# Patient Record
Sex: FEMALE | Race: OTHER | HISPANIC OR LATINO
[De-identification: names, ages, dates, MRNs, and addresses within clinical notes are randomized per-mention and may not be internally consistent; named-entity substitution may affect disease eponyms.]

---

## 2019-12-16 ENCOUNTER — ASOB RESULT (OUTPATIENT)
Age: 24
End: 2019-12-16

## 2019-12-16 ENCOUNTER — APPOINTMENT (OUTPATIENT)
Dept: ANTEPARTUM | Facility: CLINIC | Age: 24
End: 2019-12-16
Payer: MEDICAID

## 2019-12-16 PROCEDURE — 76801 OB US < 14 WKS SINGLE FETUS: CPT

## 2019-12-17 PROBLEM — Z00.00 ENCOUNTER FOR PREVENTIVE HEALTH EXAMINATION: Status: ACTIVE | Noted: 2019-12-17

## 2020-01-16 ENCOUNTER — APPOINTMENT (OUTPATIENT)
Dept: ANTEPARTUM | Facility: CLINIC | Age: 25
End: 2020-01-16
Payer: MEDICAID

## 2020-01-16 ENCOUNTER — ASOB RESULT (OUTPATIENT)
Age: 25
End: 2020-01-16

## 2020-01-16 PROCEDURE — 76815 OB US LIMITED FETUS(S): CPT

## 2020-02-13 ENCOUNTER — APPOINTMENT (OUTPATIENT)
Dept: ANTEPARTUM | Facility: CLINIC | Age: 25
End: 2020-02-13
Payer: MEDICAID

## 2020-02-13 ENCOUNTER — ASOB RESULT (OUTPATIENT)
Age: 25
End: 2020-02-13

## 2020-02-13 PROCEDURE — 76817 TRANSVAGINAL US OBSTETRIC: CPT

## 2020-02-13 PROCEDURE — 76805 OB US >/= 14 WKS SNGL FETUS: CPT

## 2020-05-06 ENCOUNTER — OUTPATIENT (OUTPATIENT)
Dept: OUTPATIENT SERVICES | Facility: HOSPITAL | Age: 25
LOS: 1 days | End: 2020-05-06
Payer: COMMERCIAL

## 2020-05-06 VITALS — DIASTOLIC BLOOD PRESSURE: 69 MMHG | SYSTOLIC BLOOD PRESSURE: 109 MMHG | HEART RATE: 75 BPM

## 2020-05-06 VITALS — SYSTOLIC BLOOD PRESSURE: 117 MMHG | DIASTOLIC BLOOD PRESSURE: 75 MMHG | HEART RATE: 83 BPM

## 2020-05-06 DIAGNOSIS — O47.03 FALSE LABOR BEFORE 37 COMPLETED WEEKS OF GESTATION, THIRD TRIMESTER: ICD-10-CM

## 2020-05-06 PROCEDURE — G0463: CPT

## 2020-05-06 PROCEDURE — 59025 FETAL NON-STRESS TEST: CPT

## 2020-05-06 RX ORDER — ACETAMINOPHEN 500 MG
975 TABLET ORAL ONCE
Refills: 0 | Status: COMPLETED | OUTPATIENT
Start: 2020-05-06 | End: 2020-05-06

## 2020-05-06 RX ADMIN — Medication 975 MILLIGRAM(S): at 20:00

## 2020-05-06 NOTE — OB PROVIDER TRIAGE NOTE - NSHPPHYSICALEXAM_GEN_ALL_CORE
ICU Vital Signs Last 24 Hrs  T(C): 36.6 (06 May 2020 19:28), Max: 36.6 (06 May 2020 19:28)  T(F): 97.9 (06 May 2020 19:28), Max: 97.9 (06 May 2020 19:28)  HR: 83 (06 May 2020 19:28) (83 - 83)  BP: 117/75 (06 May 2020 19:28) (117/75 - 117/75)  RR: 16 (06 May 2020 19:28) (16 - 16)        General: AOx3, NAD  Heart: RRR  Lungs: CTAB  Abd: Soft, TTP along lower abdomen, gravid. Negative CVA tenderness bilaterally  SVE: 0/0/-3            FHT: 145 bpm, periods of minimal variability + accels no decels present  [place]: No ctx present

## 2020-05-06 NOTE — OB PROVIDER TRIAGE NOTE - HISTORY OF PRESENT ILLNESS
DAVIN FRAGOSO is a 25y  at 31w6d by LMP who presents to L&D for complaints of lower abdominal cramping pain over the past day.    Patient states that around 1pm, she began experiencing 5/10 lower abdominal pain. She states that she went to the [facility] office for evaluation, at which time they advised her to come to L&D for further eval. She denies taking any OTC pain meds for pain relief, but states that the pain is somewhat alleviated when laying on her side.  Otherwise, denies any LOF, vaginal bleeding, dysuria or contraction like pain. Reports good fetal movement.      Prenatal course otherwise uncomplicated    OBhx: Denies  PMH: Midgut hernia  PSH: Denies  Med: PNV  Allergies: NKDA

## 2020-05-06 NOTE — OB PROVIDER TRIAGE NOTE - NSOBPROVIDERNOTE_OBGYN_ALL_OB_FT
DAVIN FRAGOSO is a 25y  at 31w6d by LMP who presents to L&D for complaints of lower abdominal cramping pain over the past day; r/o PTL vs. round ligament pain.      PLAN:  - FHT presents with periods of minimal variability. Will provide patient with juice and ice to aid with variability  - Unofficial bedside BPP: . Good fetal movements noted  - Will provide patient with tylenol 975mg to aid with lower abdominal pain.  - Will continue to monitor

## 2020-05-21 ENCOUNTER — ASOB RESULT (OUTPATIENT)
Age: 25
End: 2020-05-21

## 2020-05-21 ENCOUNTER — APPOINTMENT (OUTPATIENT)
Dept: ANTEPARTUM | Facility: CLINIC | Age: 25
End: 2020-05-21
Payer: MEDICAID

## 2020-05-21 VITALS — TEMPERATURE: 98.9 F

## 2020-05-21 PROCEDURE — 76819 FETAL BIOPHYS PROFIL W/O NST: CPT

## 2020-05-21 PROCEDURE — 76816 OB US FOLLOW-UP PER FETUS: CPT

## 2020-06-25 PROBLEM — Z78.9 OTHER SPECIFIED HEALTH STATUS: Chronic | Status: ACTIVE | Noted: 2020-05-06

## 2020-06-29 ENCOUNTER — APPOINTMENT (OUTPATIENT)
Dept: ANTEPARTUM | Facility: CLINIC | Age: 25
End: 2020-06-29
Payer: MEDICAID

## 2020-06-29 ENCOUNTER — ASOB RESULT (OUTPATIENT)
Age: 25
End: 2020-06-29

## 2020-06-29 PROCEDURE — 76816 OB US FOLLOW-UP PER FETUS: CPT

## 2020-06-29 PROCEDURE — 76819 FETAL BIOPHYS PROFIL W/O NST: CPT

## 2020-07-07 ENCOUNTER — OUTPATIENT (OUTPATIENT)
Dept: INPATIENT UNIT | Facility: HOSPITAL | Age: 25
LOS: 1 days | End: 2020-07-07
Payer: COMMERCIAL

## 2020-07-07 ENCOUNTER — INPATIENT (INPATIENT)
Facility: HOSPITAL | Age: 25
LOS: 0 days | Discharge: HOME | End: 2020-07-08
Attending: OBSTETRICS & GYNECOLOGY | Admitting: OBSTETRICS & GYNECOLOGY
Payer: COMMERCIAL

## 2020-07-07 ENCOUNTER — TRANSCRIPTION ENCOUNTER (OUTPATIENT)
Age: 25
End: 2020-07-07

## 2020-07-07 VITALS — TEMPERATURE: 98 F | RESPIRATION RATE: 18 BRPM

## 2020-07-07 VITALS — RESPIRATION RATE: 17 BRPM | TEMPERATURE: 98 F

## 2020-07-07 VITALS — HEART RATE: 78 BPM | DIASTOLIC BLOOD PRESSURE: 73 MMHG | SYSTOLIC BLOOD PRESSURE: 122 MMHG

## 2020-07-07 DIAGNOSIS — O47.1 FALSE LABOR AT OR AFTER 37 COMPLETED WEEKS OF GESTATION: ICD-10-CM

## 2020-07-07 DIAGNOSIS — Z3A.39 39 WEEKS GESTATION OF PREGNANCY: ICD-10-CM

## 2020-07-07 LAB
BASOPHILS # BLD AUTO: 0.02 K/UL — SIGNIFICANT CHANGE UP (ref 0–0.2)
BASOPHILS NFR BLD AUTO: 0.2 % — SIGNIFICANT CHANGE UP (ref 0–2)
BLD GP AB SCN SERPL QL: SIGNIFICANT CHANGE UP
EOSINOPHIL # BLD AUTO: 0.03 K/UL — SIGNIFICANT CHANGE UP (ref 0–0.5)
EOSINOPHIL NFR BLD AUTO: 0.3 % — SIGNIFICANT CHANGE UP (ref 0–6)
HCT VFR BLD CALC: 40.5 % — SIGNIFICANT CHANGE UP (ref 34.5–45)
HGB BLD-MCNC: 13.3 G/DL — SIGNIFICANT CHANGE UP (ref 11.5–15.5)
IMM GRANULOCYTES NFR BLD AUTO: 0.8 % — SIGNIFICANT CHANGE UP (ref 0–1.5)
LYMPHOCYTES # BLD AUTO: 1.59 K/UL — SIGNIFICANT CHANGE UP (ref 1–3.3)
LYMPHOCYTES # BLD AUTO: 15.1 % — SIGNIFICANT CHANGE UP (ref 13–44)
MCHC RBC-ENTMCNC: 27.8 PG — SIGNIFICANT CHANGE UP (ref 27–34)
MCHC RBC-ENTMCNC: 32.8 GM/DL — SIGNIFICANT CHANGE UP (ref 32–36)
MCV RBC AUTO: 84.6 FL — SIGNIFICANT CHANGE UP (ref 80–100)
MONOCYTES # BLD AUTO: 0.88 K/UL — SIGNIFICANT CHANGE UP (ref 0–0.9)
MONOCYTES NFR BLD AUTO: 8.3 % — SIGNIFICANT CHANGE UP (ref 2–14)
NEUTROPHILS # BLD AUTO: 7.94 K/UL — HIGH (ref 1.8–7.4)
NEUTROPHILS NFR BLD AUTO: 75.3 % — SIGNIFICANT CHANGE UP (ref 43–77)
PLATELET # BLD AUTO: 190 K/UL — SIGNIFICANT CHANGE UP (ref 150–400)
RBC # BLD: 4.79 M/UL — SIGNIFICANT CHANGE UP (ref 3.8–5.2)
RBC # FLD: 16.4 % — HIGH (ref 10.3–14.5)
SARS-COV-2 RNA SPEC QL NAA+PROBE: SIGNIFICANT CHANGE UP
WBC # BLD: 10.54 K/UL — HIGH (ref 3.8–10.5)
WBC # FLD AUTO: 10.54 K/UL — HIGH (ref 3.8–10.5)

## 2020-07-07 PROCEDURE — 59025 FETAL NON-STRESS TEST: CPT

## 2020-07-07 PROCEDURE — G0463: CPT

## 2020-07-07 RX ORDER — TETANUS TOXOID, REDUCED DIPHTHERIA TOXOID AND ACELLULAR PERTUSSIS VACCINE, ADSORBED 5; 2.5; 8; 8; 2.5 [IU]/.5ML; [IU]/.5ML; UG/.5ML; UG/.5ML; UG/.5ML
0.5 SUSPENSION INTRAMUSCULAR ONCE
Refills: 0 | Status: DISCONTINUED | OUTPATIENT
Start: 2020-07-07 | End: 2020-07-08

## 2020-07-07 RX ORDER — HYDROCORTISONE 1 %
1 OINTMENT (GRAM) TOPICAL EVERY 6 HOURS
Refills: 0 | Status: DISCONTINUED | OUTPATIENT
Start: 2020-07-07 | End: 2020-07-08

## 2020-07-07 RX ORDER — OXYTOCIN 10 UNIT/ML
333.33 VIAL (ML) INJECTION
Qty: 20 | Refills: 0 | Status: DISCONTINUED | OUTPATIENT
Start: 2020-07-07 | End: 2020-07-08

## 2020-07-07 RX ORDER — SODIUM CHLORIDE 9 MG/ML
3 INJECTION INTRAMUSCULAR; INTRAVENOUS; SUBCUTANEOUS EVERY 8 HOURS
Refills: 0 | Status: DISCONTINUED | OUTPATIENT
Start: 2020-07-07 | End: 2020-07-08

## 2020-07-07 RX ORDER — DIPHENHYDRAMINE HCL 50 MG
25 CAPSULE ORAL EVERY 6 HOURS
Refills: 0 | Status: DISCONTINUED | OUTPATIENT
Start: 2020-07-07 | End: 2020-07-08

## 2020-07-07 RX ORDER — KETOROLAC TROMETHAMINE 30 MG/ML
30 SYRINGE (ML) INJECTION ONCE
Refills: 0 | Status: DISCONTINUED | OUTPATIENT
Start: 2020-07-07 | End: 2020-07-08

## 2020-07-07 RX ORDER — SIMETHICONE 80 MG/1
80 TABLET, CHEWABLE ORAL EVERY 4 HOURS
Refills: 0 | Status: DISCONTINUED | OUTPATIENT
Start: 2020-07-07 | End: 2020-07-08

## 2020-07-07 RX ORDER — LANOLIN
1 OINTMENT (GRAM) TOPICAL EVERY 6 HOURS
Refills: 0 | Status: DISCONTINUED | OUTPATIENT
Start: 2020-07-07 | End: 2020-07-08

## 2020-07-07 RX ORDER — PRAMOXINE HYDROCHLORIDE 150 MG/15G
1 AEROSOL, FOAM RECTAL EVERY 4 HOURS
Refills: 0 | Status: DISCONTINUED | OUTPATIENT
Start: 2020-07-07 | End: 2020-07-08

## 2020-07-07 RX ORDER — DIBUCAINE 1 %
1 OINTMENT (GRAM) RECTAL EVERY 6 HOURS
Refills: 0 | Status: DISCONTINUED | OUTPATIENT
Start: 2020-07-07 | End: 2020-07-08

## 2020-07-07 RX ORDER — AMPICILLIN TRIHYDRATE 250 MG
1 CAPSULE ORAL EVERY 4 HOURS
Refills: 0 | Status: DISCONTINUED | OUTPATIENT
Start: 2020-07-07 | End: 2020-07-07

## 2020-07-07 RX ORDER — CITRIC ACID/SODIUM CITRATE 300-500 MG
30 SOLUTION, ORAL ORAL ONCE
Refills: 0 | Status: COMPLETED | OUTPATIENT
Start: 2020-07-07 | End: 2020-07-07

## 2020-07-07 RX ORDER — AER TRAVELER 0.5 G/1
1 SOLUTION RECTAL; TOPICAL EVERY 4 HOURS
Refills: 0 | Status: DISCONTINUED | OUTPATIENT
Start: 2020-07-07 | End: 2020-07-08

## 2020-07-07 RX ORDER — IBUPROFEN 200 MG
600 TABLET ORAL EVERY 6 HOURS
Refills: 0 | Status: COMPLETED | OUTPATIENT
Start: 2020-07-07 | End: 2021-06-05

## 2020-07-07 RX ORDER — SODIUM CHLORIDE 9 MG/ML
1000 INJECTION, SOLUTION INTRAVENOUS
Refills: 0 | Status: DISCONTINUED | OUTPATIENT
Start: 2020-07-07 | End: 2020-07-07

## 2020-07-07 RX ORDER — OXYCODONE HYDROCHLORIDE 5 MG/1
5 TABLET ORAL
Refills: 0 | Status: DISCONTINUED | OUTPATIENT
Start: 2020-07-07 | End: 2020-07-08

## 2020-07-07 RX ORDER — OXYCODONE HYDROCHLORIDE 5 MG/1
5 TABLET ORAL ONCE
Refills: 0 | Status: DISCONTINUED | OUTPATIENT
Start: 2020-07-07 | End: 2020-07-08

## 2020-07-07 RX ORDER — SODIUM CHLORIDE 9 MG/ML
1000 INJECTION, SOLUTION INTRAVENOUS ONCE
Refills: 0 | Status: COMPLETED | OUTPATIENT
Start: 2020-07-07 | End: 2020-07-07

## 2020-07-07 RX ORDER — MAGNESIUM HYDROXIDE 400 MG/1
30 TABLET, CHEWABLE ORAL
Refills: 0 | Status: DISCONTINUED | OUTPATIENT
Start: 2020-07-07 | End: 2020-07-08

## 2020-07-07 RX ORDER — ACETAMINOPHEN 500 MG
975 TABLET ORAL
Refills: 0 | Status: DISCONTINUED | OUTPATIENT
Start: 2020-07-07 | End: 2020-07-08

## 2020-07-07 RX ORDER — BENZOCAINE 10 %
1 GEL (GRAM) MUCOUS MEMBRANE EVERY 6 HOURS
Refills: 0 | Status: DISCONTINUED | OUTPATIENT
Start: 2020-07-07 | End: 2020-07-08

## 2020-07-07 RX ORDER — AMPICILLIN TRIHYDRATE 250 MG
2 CAPSULE ORAL ONCE
Refills: 0 | Status: COMPLETED | OUTPATIENT
Start: 2020-07-07 | End: 2020-07-07

## 2020-07-07 RX ADMIN — Medication 216 GRAM(S): at 17:12

## 2020-07-07 RX ADMIN — SODIUM CHLORIDE 125 MILLILITER(S): 9 INJECTION, SOLUTION INTRAVENOUS at 15:35

## 2020-07-07 RX ADMIN — Medication 1000 MILLIUNIT(S)/MIN: at 19:29

## 2020-07-07 RX ADMIN — SODIUM CHLORIDE 125 MILLILITER(S): 9 INJECTION, SOLUTION INTRAVENOUS at 18:00

## 2020-07-07 RX ADMIN — Medication 30 MILLILITER(S): at 17:45

## 2020-07-07 RX ADMIN — SODIUM CHLORIDE 4000 MILLILITER(S): 9 INJECTION, SOLUTION INTRAVENOUS at 17:30

## 2020-07-07 NOTE — OB PROVIDER DELIVERY SUMMARY - NSPROVIDERDELIVERYNOTE_OBGYN_ALL_OB_FT
with good maternal pushing , head of baby delivered. shoulder and body delivered atraumatically    nuchal cor x 2, true knot. Cord clamped and cut. placenta delivered intact.  uterus firm , laceration repaired. mild bleeding at the end of repair. cervix intact. uterus firm . continue IV Pitocin.

## 2020-07-07 NOTE — OB PROVIDER H&P - HISTORY OF PRESENT ILLNESS
25 y.o.  @ 39weeks 2 days presents to labor and delivery with contractions and bloody show.   She is having painful contracts, reports fetal movement and vaginal bleeding. She does not report loss of fluid. She started having contractions at midnight last night. She doesn't want pain medication at this time.

## 2020-07-07 NOTE — OB PROVIDER H&P - NSHPPHYSICALEXAM_GEN_ALL_CORE
Gen: NAD  Pulm: CTAB  Cardiac: RRR  Abd: gravid, non-tender, + BS  Ext: 1+ edema    SVE: 4/60/-2, intact    sono: vertex, left fundal placenta

## 2020-07-07 NOTE — OB RN DELIVERY SUMMARY - NS_SEPSISRSKCALC_OBGYN_ALL_OB_FT
EOS calculated successfully. EOS Risk Factor: 0.5/1000 live births ([facility] national incidence); GA=39w2d; Temp=97.7; ROM=0.983; GBS='Positive'; Antibiotics='GBS specific antibiotics > 2 hrs prior to birth'

## 2020-07-07 NOTE — DISCHARGE NOTE OB - PLAN OF CARE
rapid recovery Patient should transition to regular activity level. Resume regular diet. Patient should follow up with her OB for postpartum checkup in 4-6 weeks. Patient should call her doctor sooner if she develops fever or uncontrolled vaginal bleeding. Take medications as directed. Exclusive breast feeding for the first 6 months is recommended. Nothing per vagina for 6 weeks (incl. sex, douching, etc). If you have additional concerns, please inform your provider.

## 2020-07-07 NOTE — OB PROVIDER H&P - LANGUAGE ASSISTANCE NEEDED
Yes-Patient/Caregiver accepts free interpretation services.../admission done in Haitian by NIMISHA Rodriguez RNC

## 2020-07-07 NOTE — OB PROVIDER H&P - ATTENDING COMMENTS
25 y.o.  in early labor  GBS +  VE /-1 vtx intact  plan   admit  expectant management   DVT ppx  GBS ppx

## 2020-07-07 NOTE — OB RN TRIAGE NOTE - LANGUAGE ASSISTANCE NEEDED
triage done in [place]ik by NIMISHA Rodriguez RNC/Yes-Patient/Caregiver accepts free interpretation services...

## 2020-07-07 NOTE — DISCHARGE NOTE OB - PATIENT PORTAL LINK FT
You can access the FollowMyHealth Patient Portal offered by [facility] by registering at the following website: [email or web address]. By joining [facility]’s FollowMyHealth portal, you will also be able to view your health information using other applications (apps) compatible with our system.

## 2020-07-07 NOTE — OB RN PATIENT PROFILE - LANGUAGE ASSISTANCE NEEDED
admission done in [place] by NIMISHA Rodriguez RNC/Yes-Patient/Caregiver accepts free interpretation services...

## 2020-07-07 NOTE — DISCHARGE NOTE OB - CARE PROVIDER_API CALL
Joana Aguilar)  Obstetrics and Gynecology  [address] [place] [address]  Phone: [phone]  Fax: [phone]  Follow Up Time:

## 2020-07-07 NOTE — DISCHARGE NOTE OB - CARE PLAN
Principal Discharge DX:	39 weeks gestation of pregnancy  Goal:	rapid recovery  Assessment and plan of treatment:	Patient should transition to regular activity level. Resume regular diet. Patient should follow up with her OB for postpartum checkup in 4-6 weeks. Patient should call her doctor sooner if she develops fever or uncontrolled vaginal bleeding. Take medications as directed. Exclusive breast feeding for the first 6 months is recommended. Nothing per vagina for 6 weeks (incl. sex, douching, etc). If you have additional concerns, please inform your provider.

## 2020-07-07 NOTE — DISCHARGE NOTE OB - MEDICATION SUMMARY - MEDICATIONS TO TAKE
I will START or STAY ON the medications listed below when I get home from the hospital:  None I will START or STAY ON the medications listed below when I get home from the hospital:    ibuprofen 600 mg oral tablet  -- 1 tab(s) by mouth every 6 hours, As Needed -for mild pain   -- Do not take this drug if you are pregnant.  It is very important that you take or use this exactly as directed.  Do not skip doses or discontinue unless directed by your doctor.  May cause drowsiness or dizziness.  Obtain medical advice before taking any non-prescription drugs as some may affect the action of this medication.  Take with food or milk.    -- Indication: For pain    Colace 100 mg oral capsule  -- 1 cap(s) by mouth 2 times a day, As Needed -for constipation   -- Medication should be taken with plenty of water.    -- Indication: For constipation

## 2020-07-07 NOTE — OB PROVIDER H&P - ASSESSMENT
25 y.o. G1PO in early labor with painful contractions 9/10 and bloody show.  -GBS+  -mid gut herniation on right side 25 y.o.  in early labor with painful contractions 9/10 and bloody show.  -GBS+  -mid gut herniation on right side

## 2020-07-07 NOTE — OB PROVIDER TRIAGE NOTE - NSOBPROVIDERNOTE_OBGYN_ALL_OB_FT
A/P:    24 y/o  presenting at 39w2d by LMP in early labor    -Observ toco and fetal monitoring   -GBS: unknown   -Analgesia: not requiring A/P:    24 y/o  presenting at 39w2d by LMP in early labor    -Observ toco and fetal monitoring   -GBS: unknown   -Analgesia: not requiring      Update:  patient re-examined: /-  Patient contractions 5-6min  Tracing reactive  [place]: q5-6min contractions, not requiring pain medication  Discussed with Dr. Rizvi  Can be discharged.   Ob precautions given on when to return

## 2020-07-08 VITALS
SYSTOLIC BLOOD PRESSURE: 116 MMHG | TEMPERATURE: 98 F | RESPIRATION RATE: 18 BRPM | HEART RATE: 74 BPM | DIASTOLIC BLOOD PRESSURE: 79 MMHG

## 2020-07-08 LAB — T PALLIDUM AB TITR SER: NEGATIVE — SIGNIFICANT CHANGE UP

## 2020-07-08 PROCEDURE — 86900 BLOOD TYPING SEROLOGIC ABO: CPT

## 2020-07-08 PROCEDURE — 86901 BLOOD TYPING SEROLOGIC RH(D): CPT

## 2020-07-08 PROCEDURE — 86850 RBC ANTIBODY SCREEN: CPT

## 2020-07-08 PROCEDURE — 85027 COMPLETE CBC AUTOMATED: CPT

## 2020-07-08 PROCEDURE — 76815 OB US LIMITED FETUS(S): CPT

## 2020-07-08 PROCEDURE — T1013: CPT

## 2020-07-08 PROCEDURE — 87635 SARS-COV-2 COVID-19 AMP PRB: CPT

## 2020-07-08 PROCEDURE — G0463: CPT

## 2020-07-08 PROCEDURE — 86780 TREPONEMA PALLIDUM: CPT

## 2020-07-08 PROCEDURE — 36415 COLL VENOUS BLD VENIPUNCTURE: CPT

## 2020-07-08 PROCEDURE — 59050 FETAL MONITOR W/REPORT: CPT

## 2020-07-08 RX ORDER — IBUPROFEN 200 MG
600 TABLET ORAL EVERY 6 HOURS
Refills: 0 | Status: DISCONTINUED | OUTPATIENT
Start: 2020-07-08 | End: 2020-07-08

## 2020-07-08 RX ORDER — DOCUSATE SODIUM 100 MG
1 CAPSULE ORAL
Qty: 14 | Refills: 0
Start: 2020-07-08 | End: 2020-07-14

## 2020-07-08 RX ORDER — IBUPROFEN 200 MG
1 TABLET ORAL
Qty: 28 | Refills: 0
Start: 2020-07-08 | End: 2020-07-14

## 2020-07-08 RX ADMIN — Medication 600 MILLIGRAM(S): at 15:12

## 2020-07-08 RX ADMIN — Medication 600 MILLIGRAM(S): at 05:34

## 2020-07-08 NOTE — PROGRESS NOTE ADULT - SUBJECTIVE AND OBJECTIVE BOX
DAVIN FRAGOSO is a 25y  s/p  PPD1 of a viable female infant.     SUBJECTIVE:  Patient complains of whole leg aching bilaterally that she had on admission, otherwise feels okay.  Pain is well controlled with PRN pain medication.   She is ambulating, voiding, tolerating PO. Denies N/V.  minimal lochia.      OBJECTIVE:  Physical exam:  General: AOx3, NAD.  Heart: Regular, rate, and rhythm  Lungs: CTAB  Abdomen: Soft, appropriately tender to palpitation, firm uterine fundus at umbilicus.  Ext: No DVT signs, warm extremities. Neg homans. No swelling bilaterally.     Vital Signs Last 24 Hrs  T(C): 36.8 (2020 04:16), Max: 37.5 (2020 20:56)  T(F): 98.2 (2020 04:16), Max: 99.5 (2020 20:56)  HR: 74 (2020 04:16) (74 - 107)  BP: 109/69 (2020 04:16) (99/53 - 144/82)  RR: 17 (2020 04:16) (16 - 17)  SpO2: 98% (2020 04:16) (90% - 100%)    LABS:                        13.3   10.54 )-----------( 190      ( 2020 16:36 )             40.5

## 2020-07-08 NOTE — PROGRESS NOTE ADULT - ASSESSMENT
A/P: DAVIN FRAGOSO is a 25y  s/p  PPD1 of a viable female infant.   - Stable  - Continue to monitor for worsening of leg pain  - Hgb 13.3 prior to delivery, EBL 100cc  - Pain: well controlled on PO pain meds  - GI: Regular diet  - : voiding  - DVT prophylaxis: ambulate  - Dispo: Home today pending attending approval

## 2021-11-17 NOTE — OB PROVIDER TRIAGE NOTE - HISTORY OF PRESENT ILLNESS
normal mood with appropriate affect 24 y/o  presenting at 39w2d by LMP for contractions that began at 11PM, experiencing mild discomfort but not requiring pain medications at this time.    KIRBY: 2020    + Contractions q 5mins, lasting 1min  no loss of fluid  no vaginal bleeding  + FM    PMH: None  PSH: None  All: NKDA  Rx: PNV    GBS: positive  RPR: negative  Hep B: Nonreactive  Rubella: Immune  HIV: Nonreactive  ABO: O+  Covid: pending

## 2022-03-28 NOTE — OB RN TRIAGE NOTE - NS_TRIAGETIMEOF NOTIFICATION_OBGYN_ALL_OB_DT
Thank you for allowing us to be a part of your care today. We strive to provide the best care for you today and in the future. Here are a few important reminders:   · Our goal is to review and report all test/imaging results within 24-48 hours (unless otherwise specified by the clinician). If you have not received your test results within this time period, please contact the clinic at [phone] and ask to speak to a RN Specialist or Cancer Care Coordinator.   · You can also receive your test results on-line quickly and easily by enrolling in [facility] by visiting [email or web address].  · Central Scheduling should contact you in 24-48 hours if any imaging is ordered during this visit. Please contact Central Scheduling at [phone] in case you do not receive a call.   · Due to the recent change in narcotic laws and our commitment to patient safety and well-being, narcotic refills will be evaluated on a strict case by case basis. We will not address any requests for re-fills after noon on Fridays.   · If you have paperwork, complete your portion of the paperwork and either drop off or fax the forms to [phone]. Make sure to leave clear instruction of where you would like the completed paperwork to go and include a valid phone number. It may take staff up to 7 days to complete.  ·   
06-May-2020 19:31

## 2023-06-28 ENCOUNTER — APPOINTMENT (OUTPATIENT)
Dept: SURGERY | Facility: CLINIC | Age: 28
End: 2023-06-28
Payer: MEDICAID

## 2023-06-28 VITALS
WEIGHT: 150 LBS | BODY MASS INDEX: 26.58 KG/M2 | HEIGHT: 63 IN | DIASTOLIC BLOOD PRESSURE: 62 MMHG | SYSTOLIC BLOOD PRESSURE: 106 MMHG

## 2023-06-28 DIAGNOSIS — N64.52 NIPPLE DISCHARGE: ICD-10-CM

## 2023-06-28 DIAGNOSIS — N60.12 DIFFUSE CYSTIC MASTOPATHY OF LEFT BREAST: ICD-10-CM

## 2023-06-28 PROCEDURE — 99203 OFFICE O/P NEW LOW 30 MIN: CPT

## 2023-06-28 NOTE — HISTORY OF PRESENT ILLNESS
[FreeTextEntry1] : Problem List:\par 1. Bilateral Nipple discharge\par 2. Breast mass found on US 1.1 cm \par     -large dilated ducts with focal usual ductal hyperplasia, CONCORDANT \par \par Care Team:\par PCP- Nguyen Weldon \par \par \par HPI: Patient is a 28 year old female presenting for initial consultation on 6/27/23 for left nipple discharge. Patient's youngest child is two years old stopped breastfeeding child at six months, child preferred left breast. Experienced left milky discharge on manipulation of nipple shortly after breastfeeding, discharge only with manipulation has been constant since. Never bloody.  In April patient developed sudden onset of left breast swelling tenderness and erythema, US without abscess, given oral antibiotics and symptoms resolved. Patient had ultrasound bilateral on 5/25/23 showing an intraductal mass in right breast measuring 1.1 cm, US guided biopsy on 6/12/23 showed benign findings of large dilated ducts with focal usual ductal hyperplasia. \par \par Breast History: No family history of breast cancer. Preforms SBE \par \par Imaging:\par 04/24/23: [facility]: Ultrasound Soft Tissue Thorax: Impression -No sonographic evidence of breast abscess \par \par 05/25/23: [facility]: Breast Ultrasound Bilateral Complete: Impression - There is an intraductal mass with questionable associated vascularity in the right breast retroareolar region. This measures 1. 1 x 0. 3 x 0. 7 cm and is indeterminate. Spot the site of pain at 7:00 2 cm from left nipple is a complicated cyst measuring 0. 8 cm. BIRADS 4 \par \par 06/12/23: [facility]: US Guided Biopsy Right Breast: Marking clip: large dilated ducts with focal usual ductal hyperplasia, stromal fibrosis, CONCORDANT

## 2023-06-28 NOTE — PHYSICAL EXAM
[Normocephalic] : normocephalic [Supple] : supple [No Axillary Lymphadenopathy] : no left axillary lymphadenopathy [No Edema] : no edema [Asymmetrical] : asymmetrical [Bra Size: ___] : Bra Size: [unfilled] [Grade 2] : Ptosis Grade 2 [No dominant masses] : no dominant masses in right breast  [No dominant masses] : no dominant masses left breast [No Nipple Retraction] : no left nipple retraction [de-identified] : R> L  [de-identified] : Clear/milky discharge noted on manipulation from multiple ducts  [de-identified] : Clear/milky discharge noted on manipulation from multiple ducts

## 2023-06-28 NOTE — PAST MEDICAL HISTORY
[Menstruating] : The patient is menstruating [Menarche Age ____] : age at menarche was [unfilled] [Total Preg ___] : G[unfilled] [Live Births ___] : P[unfilled]  [History of Hormone Replacement Treatment] : has no history of hormone replacement treatment [FreeTextEntry6] : denies  [FreeTextEntry7] : yes for six months  [FreeTextEntry8] : yes for six months

## 2023-06-28 NOTE — ASSESSMENT
[FreeTextEntry1] : Patient is a 28 year old female presenting for initial consultation on 6/28/23 for left nipple discharge. Patient experiencing left nipple discharge only on manipulation since after she stopped breastfeeding two years ago. Recent biopsy on  intraductal mass in right breast measuring 1.1 cm found on US, benign findings, large dilated ducts with stromal fibrosis. \par \par CBE is benign, and clear/milky discharge was able to be expressed from bilateral breasts in multiple ducts. \par We discussed the difference between pathologic and physiologic nipple discharge. Physiologic discharge is typically clear, bilateral, multiple ducts, and on manipulation. Pathologic tends to be unilateral, single or only a few ducts, bloody, and spontaneous. Reassured patient that discharge can continue years after breastfeeding on manipulation. Advised to stop manipulating nipples and symptoms to monitor for in the future.  Patient to see me if any changes with discharge, become bloody or is spontaneous\par \par Discussed no further breast imaging is needed at this time. Screening mammograms start at 40 \par \par Patient verbalized understanding of all treatment plans. All of her questions were answered to the best of my ability. She was encouraged to call the office sooner for any questions or concerns\par \par Plan\par 1. Follow up as needed \par

## 2024-01-02 NOTE — OB PROVIDER H&P - PRETERM DELIVERIES, OB PROFILE
Spironolactone Counseling: Patient advised regarding risks of diarrhea, abdominal pain, hyperkalemia, birth defects (for female patients), liver toxicity and renal toxicity. The patient may need blood work to monitor liver and kidney function and potassium levels while on therapy. The patient verbalized understanding of the proper use and possible adverse effects of spironolactone.  All of the patient's questions and concerns were addressed. Isotretinoin Pregnancy And Lactation Text: This medication is Pregnancy Category X and is considered extremely dangerous during pregnancy. It is unknown if it is excreted in breast milk. Azelaic Acid Pregnancy And Lactation Text: This medication is considered safe during pregnancy and breast feeding. Topical Clindamycin Counseling: Patient counseled that this medication may cause skin irritation or allergic reactions.  In the event of skin irritation, the patient was advised to reduce the amount of the drug applied or use it less frequently.   The patient verbalized understanding of the proper use and possible adverse effects of clindamycin.  All of the patient's questions and concerns were addressed. Include Pregnancy/Lactation Warning?: No Doxycycline Counseling:  Patient counseled regarding possible photosensitivity and increased risk for sunburn.  Patient instructed to avoid sunlight, if possible.  When exposed to sunlight, patients should wear protective clothing, sunglasses, and sunscreen.  The patient was instructed to call the office immediately if the following severe adverse effects occur:  hearing changes, easy bruising/bleeding, severe headache, or vision changes.  The patient verbalized understanding of the proper use and possible adverse effects of doxycycline.  All of the patient's questions and concerns were addressed. High Dose Vitamin A Pregnancy And Lactation Text: High dose vitamin A therapy is contraindicated during pregnancy and breast feeding. Azithromycin Pregnancy And Lactation Text: This medication is considered safe during pregnancy and is also secreted in breast milk. Cleanser Recommendations: Gentle cleansers such as CeraVe or Neutrogena Sarecycline Counseling: Patient advised regarding possible photosensitivity and discoloration of the teeth, skin, lips, tongue and gums.  Patient instructed to avoid sunlight, if possible.  When exposed to sunlight, patients should wear protective clothing, sunglasses, and sunscreen.  The patient was instructed to call the office immediately if the following severe adverse effects occur:  hearing changes, easy bruising/bleeding, severe headache, or vision changes.  The patient verbalized understanding of the proper use and possible adverse effects of sarecycline.  All of the patient's questions and concerns were addressed. Erythromycin Pregnancy And Lactation Text: This medication is Pregnancy Category B and is considered safe during pregnancy. It is also excreted in breast milk. Aklief Pregnancy And Lactation Text: It is unknown if this medication is safe to use during pregnancy.  It is unknown if this medication is excreted in breast milk.  Breastfeeding women should use the topical cream on the smallest area of the skin for the shortest time needed while breastfeeding.  Do not apply to nipple and areola. 0 Tazorac Counseling:  Patient advised that medication is irritating and drying.  Patient may need to apply sparingly and wash off after an hour before eventually leaving it on overnight.  The patient verbalized understanding of the proper use and possible adverse effects of tazorac.  All of the patient's questions and concerns were addressed. Winlevi Pregnancy And Lactation Text: This medication is considered safe during pregnancy and breastfeeding. Dapsone Counseling: I discussed with the patient the risks of dapsone including but not limited to hemolytic anemia, agranulocytosis, rashes, methemoglobinemia, kidney failure, peripheral neuropathy, headaches, GI upset, and liver toxicity.  Patients who start dapsone require monitoring including baseline LFTs and weekly CBCs for the first month, then every month thereafter.  The patient verbalized understanding of the proper use and possible adverse effects of dapsone.  All of the patient's questions and concerns were addressed. Detail Level: Detailed Birth Control Pills Counseling: Birth Control Pill Counseling: I discussed with the patient the potential side effects of OCPs including but not limited to increased risk of stroke, heart attack, thrombophlebitis, deep venous thrombosis, hepatic adenomas, breast changes, GI upset, headaches, and depression.  The patient verbalized understanding of the proper use and possible adverse effects of OCPs. All of the patient's questions and concerns were addressed. Moisturizer Recommendations: Gentle oil-free moisturizers such as CeraVe or Neutrogena Topical Sulfur Applications Pregnancy And Lactation Text: This medication is Pregnancy Category C and has an unknown safety profile during pregnancy. It is unknown if this topical medication is excreted in breast milk. Topical Retinoid counseling:  Patient advised to apply a pea-sized amount only at bedtime and wait 30 minutes after washing their face before applying.  If too drying, patient may add a non-comedogenic moisturizer. The patient verbalized understanding of the proper use and possible adverse effects of retinoids.  All of the patient's questions and concerns were addressed. Benzoyl Peroxide Counseling: Patient counseled that medicine may cause skin irritation and bleach clothing.  In the event of skin irritation, the patient was advised to reduce the amount of the drug applied or use it less frequently.   The patient verbalized understanding of the proper use and possible adverse effects of benzoyl peroxide.  All of the patient's questions and concerns were addressed. Topical Clindamycin Pregnancy And Lactation Text: This medication is Pregnancy Category B and is considered safe during pregnancy. It is unknown if it is excreted in breast milk. High Dose Vitamin A Counseling: Side effects reviewed, pt to contact office should one occur. Spironolactone Pregnancy And Lactation Text: This medication can cause feminization of the male fetus and should be avoided during pregnancy. The active metabolite is also found in breast milk. Minocycline Counseling: Patient advised regarding possible photosensitivity and discoloration of the teeth, skin, lips, tongue and gums.  Patient instructed to avoid sunlight, if possible.  When exposed to sunlight, patients should wear protective clothing, sunglasses, and sunscreen.  The patient was instructed to call the office immediately if the following severe adverse effects occur:  hearing changes, easy bruising/bleeding, severe headache, or vision changes.  The patient verbalized understanding of the proper use and possible adverse effects of minocycline.  All of the patient's questions and concerns were addressed. Doxycycline Pregnancy And Lactation Text: This medication is Pregnancy Category D and not consider safe during pregnancy. It is also excreted in breast milk but is considered safe for shorter treatment courses. Tetracycline Pregnancy And Lactation Text: This medication is Pregnancy Category D and not consider safe during pregnancy. It is also excreted in breast milk. Bactrim Counseling:  I discussed with the patient the risks of sulfa antibiotics including but not limited to GI upset, allergic reaction, drug rash, diarrhea, dizziness, photosensitivity, and yeast infections.  Rarely, more serious reactions can occur including but not limited to aplastic anemia, agranulocytosis, methemoglobinemia, blood dyscrasias, liver or kidney failure, lung infiltrates or desquamative/blistering drug rashes. Dapsone Pregnancy And Lactation Text: This medication is Pregnancy Category C and is not considered safe during pregnancy or breast feeding. Azithromycin Counseling:  I discussed with the patient the risks of azithromycin including but not limited to GI upset, allergic reaction, drug rash, diarrhea, and yeast infections. Winlevi Counseling:  I discussed with the patient the risks of topical clascoterone including but not limited to erythema, scaling, itching, and stinging. Patient voiced their understanding. Azelaic Acid Counseling: Patient counseled that medicine may cause skin irritation and to avoid applying near the eyes.  In the event of skin irritation, the patient was advised to reduce the amount of the drug applied or use it less frequently.   The patient verbalized understanding of the proper use and possible adverse effects of azelaic acid.  All of the patient's questions and concerns were addressed. Tazorac Pregnancy And Lactation Text: This medication is not safe during pregnancy. It is unknown if this medication is excreted in breast milk. Isotretinoin Counseling: Patient should get monthly blood tests, not donate blood, not drive at night if vision affected, not share medication, and not undergo elective surgery for 6 months after tx completed. Side effects reviewed, pt to contact office should one occur. Birth Control Pills Pregnancy And Lactation Text: This medication should be avoided if pregnant and for the first 30 days post-partum. Aklief counseling:  Patient advised to apply a pea-sized amount only at bedtime and wait 30 minutes after washing their face before applying.  If too drying, patient may add a non-comedogenic moisturizer.  The most commonly reported side effects including irritation, redness, scaling, dryness, stinging, burning, itching, and increased risk of sunburn.  The patient verbalized understanding of the proper use and possible adverse effects of retinoids.  All of the patient's questions and concerns were addressed. Topical Retinoid Pregnancy And Lactation Text: This medication is Pregnancy Category C. It is unknown if this medication is excreted in breast milk. Erythromycin Counseling:  I discussed with the patient the risks of erythromycin including but not limited to GI upset, allergic reaction, drug rash, diarrhea, increase in liver enzymes, and yeast infections. Bactrim Pregnancy And Lactation Text: This medication is Pregnancy Category D and is known to cause fetal risk.  It is also excreted in breast milk. Bpo Recommendations: Panoxyl 4% or 10% BPO cleanser daily as tolerated. Tetracycline Counseling: Patient counseled regarding possible photosensitivity and increased risk for sunburn.  Patient instructed to avoid sunlight, if possible.  When exposed to sunlight, patients should wear protective clothing, sunglasses, and sunscreen.  The patient was instructed to call the office immediately if the following severe adverse effects occur:  hearing changes, easy bruising/bleeding, severe headache, or vision changes.  The patient verbalized understanding of the proper use and possible adverse effects of tetracycline.  All of the patient's questions and concerns were addressed. Patient understands to avoid pregnancy while on therapy due to potential birth defects. Benzoyl Peroxide Pregnancy And Lactation Text: This medication is Pregnancy Category C. It is unknown if benzoyl peroxide is excreted in breast milk. Topical Sulfur Applications Counseling: Topical Sulfur Counseling: Patient counseled that this medication may cause skin irritation or allergic reactions.  In the event of skin irritation, the patient was advised to reduce the amount of the drug applied or use it less frequently.   The patient verbalized understanding of the proper use and possible adverse effects of topical sulfur application.  All of the patient's questions and concerns were addressed.

## 2024-03-11 NOTE — OB PROVIDER TRIAGE NOTE - NS_DILATION_OBGYN_ALL_OB_NU
Pulse check by Dr. Saavedra, bedside US shows no cardiac movement, time of death pronounced by Dr. Saavedra.   0

## 2024-05-13 ENCOUNTER — APPOINTMENT (OUTPATIENT)
Dept: ANTEPARTUM | Facility: CLINIC | Age: 29
End: 2024-05-13
Payer: MEDICAID

## 2024-05-13 ENCOUNTER — ASOB RESULT (OUTPATIENT)
Age: 29
End: 2024-05-13

## 2024-05-13 PROCEDURE — 76817 TRANSVAGINAL US OBSTETRIC: CPT
